# Patient Record
Sex: MALE | Race: BLACK OR AFRICAN AMERICAN | NOT HISPANIC OR LATINO
[De-identification: names, ages, dates, MRNs, and addresses within clinical notes are randomized per-mention and may not be internally consistent; named-entity substitution may affect disease eponyms.]

---

## 2022-06-21 ENCOUNTER — NON-APPOINTMENT (OUTPATIENT)
Age: 37
End: 2022-06-21

## 2022-06-23 PROBLEM — Z00.00 ENCOUNTER FOR PREVENTIVE HEALTH EXAMINATION: Status: ACTIVE | Noted: 2022-06-23

## 2022-06-24 ENCOUNTER — NON-APPOINTMENT (OUTPATIENT)
Age: 37
End: 2022-06-24

## 2022-06-24 ENCOUNTER — APPOINTMENT (OUTPATIENT)
Dept: ORTHOPEDIC SURGERY | Facility: CLINIC | Age: 37
End: 2022-06-24
Payer: COMMERCIAL

## 2022-06-24 VITALS
DIASTOLIC BLOOD PRESSURE: 78 MMHG | SYSTOLIC BLOOD PRESSURE: 120 MMHG | HEIGHT: 72 IN | WEIGHT: 255 LBS | BODY MASS INDEX: 34.54 KG/M2

## 2022-06-24 DIAGNOSIS — M25.851 OTHER SPECIFIED JOINT DISORDERS, RIGHT HIP: ICD-10-CM

## 2022-06-24 DIAGNOSIS — M25.559 PAIN IN UNSPECIFIED HIP: ICD-10-CM

## 2022-06-24 PROCEDURE — 99204 OFFICE O/P NEW MOD 45 MIN: CPT

## 2022-06-24 PROCEDURE — 73502 X-RAY EXAM HIP UNI 2-3 VIEWS: CPT | Mod: RT

## 2022-06-24 NOTE — PHYSICAL EXAM
[de-identified] : Constitutional: Well-nourished, well-developed, No acute distress\par Respiratory:  Good respiratory effort, no SOB\par Lymphatic: No regional lymphadenopathy, no lymphedema\par Psychiatric: Pleasant and normal affect, alert and oriented x3\par Musculoskeletal: normal except where as noted in regional exam\par \par Right hip:\par \par APPEARANCE: no marked deformities, no swelling or malalignment\par POSITIVE TENDERNESS: Hip flexor region, sartorius, proximal rectus femoris\par NONTENDER: greater trochanter, TFL, gluteal region, ischium/proximal hamstring region, and pubic symphysis. \par ROM: Limited in all planes due to pain. \par RESISTIVE TESTING: MMT 4+/5 and mild pain with hip flexion, painless resisted ER/IR/SLR/abduction/adduction. \par SPECIAL TESTS: + FADIR, neg RAMOS, mild pain with loaded flexion & scouring\par  [de-identified] : \par The following radiographs were ordered and read by me during this patient's visit. I reviewed each radiograph in detail with the patient and discussed the findings as highlighted below. \par \par 2 views of the right hip were obtained today that show no fracture, or dislocation. There are no degenerative changes seen, however there is slight overgrowth of the femoral head/neck junction consistent with cam deformity. There is no malalignment. No obvious osseous abnormality. Otherwise unremarkable.

## 2022-06-24 NOTE — DISCUSSION/SUMMARY
[de-identified] : Discussed findings of today's exam and possible causes of patient's pain.  Educated patient on their most probable diagnosis of chronic right hip pain with recent atraumatic exacerbation due to likely femoral acetabular impingement and possible labral pathology.  Reviewed possible courses of treatment, and we collaboratively decided best course of treatment at this time will include conservative management.  Patient developed right hip pain when he was training for a half marathon, he is advised on his x-ray findings regarding likely cam deformity and how this leads to repetitive microtrauma and possible labral pathology when doing repetitive activity such as training for a half marathon.  Patient was seen by an outside orthopedic, he was started on tramadol, cyclobenzaprine, and Naprosyn.  Patient was educated that tramadol is a low-dose narcotic pain medication with addictive potential, I do not recommend it for some chronic inflammatory pain which she is having, I recommend he DC this medication at this time.  Patient was educated that cyclobenzaprine is a muscle relaxant which carries side effect of sedation, this is primarily for muscle spasm of which he is not having, I do not recommend he continue utilization of this medication.  Patient is advised that Naprosyn is an anti-inflammatory which is the medication of choice when treating inflammatory pain such as ALE.  Patient can take Naprosyn twice a day consistently with food for the next 1 week, then as needed thereafter.  Patient states an understanding of this prescription drug management plan.  With the chronicity of the patient's pain and his x-ray findings I recommend we proceed with MRI of the right hip to evaluate for labral pathology.  Patient would benefit from a trial of conservative management with physical therapy, a prescription has been provided today.  If patient has persistent pain, particularly with labral pathology on MRI may consider diagnostic/therapeutic intra-articular cortisone injection.  If patient has significant morphology findings such as cam deformity, and/or significant labral pathology, may consider surgical consultation at that time.  Patient will follow-up once MRI results are available.  Patient appreciates and agrees with current plan.\par \par This note was generated using dragon medical dictation software.  A reasonable effort has been made for proofreading its contents, but typos may still remain.  If there are any questions or points of clarification needed please notify my office.

## 2022-06-24 NOTE — HISTORY OF PRESENT ILLNESS
[Bending] : worsened by bending [Walking] : worsened by walking [NSAIDs] : relieved by nonsteroidal anti-inflammatory drugs [Rest] : relieved by rest [de-identified] : Patient is here for intermittent right hip pain since 01/01/22, progressively worsening over time. Pt denies recent injury/falls. Pain is described as sharp/achy. 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 Pt was diagnosed with inguinal hernia 10 years ago and was treated with PT. Pt takes Tramadol 50mg daily, Cyclobenzaprine BID and naproxen. Pt denies numbness,tingling, or weakness on B/L LE. Pt denies having cortisone injections in the past, does not participate with PT at this time. \par Pt denies fever, chills, weight changes, loss of bladder control, bowel incontinence or urinary retention or saddle anesthesia\par The patient's past medical history, past surgical history, medications, allergies, and social history were reviewed by me today with the patient and documented accordingly. In addition, the patient's family history, which is noncontributory to this visit, was also reviewed.\par \par \par \par \par \par \par The patient's past medical history, past surgical history, medications and allergies were reviewed by me today and documented accordingly. In addition, the patient's family and social history, which were noncontributory to this visit, were reviewed also. Intake form was reviewed. The patient has no family history of arthritis.  [Ataxia] : no ataxia [Incontinence] : no incontinence [Loss of Dexterity] : good dexterity [Urinary Ret.] : no urinary retention [de-identified] : squatting provides some pain relief

## 2022-07-06 ENCOUNTER — NON-APPOINTMENT (OUTPATIENT)
Age: 37
End: 2022-07-06